# Patient Record
Sex: MALE | Race: WHITE | Employment: UNEMPLOYED | URBAN - METROPOLITAN AREA
[De-identification: names, ages, dates, MRNs, and addresses within clinical notes are randomized per-mention and may not be internally consistent; named-entity substitution may affect disease eponyms.]

---

## 2024-04-04 ENCOUNTER — HOSPITAL ENCOUNTER (OUTPATIENT)
Age: 24
Discharge: HOME | End: 2024-04-04
Payer: COMMERCIAL

## 2024-04-04 LAB
ALBUMIN SERPL-MCNC: 4.3 G/DL (ref 3.4–5)
ALBUMIN/GLOB SERPL: 1.5 {RATIO} (ref 1.1–2.2)
ALP SERPL-CCNC: 67 U/L (ref 40–129)
ALT SERPL-CCNC: 72 U/L (ref 10–40)
ANION GAP SERPL CALCULATED.3IONS-SCNC: 10 MMOL/L (ref 3–16)
AST SERPL-CCNC: 116 U/L (ref 15–37)
BILIRUB SERPL-MCNC: <0.2 MG/DL (ref 0–1)
BUN SERPL-MCNC: 9 MG/DL (ref 7–20)
CALCIUM SERPL-MCNC: 10 MG/DL (ref 8.3–10.6)
CHLORIDE SERPL-SCNC: 105 MMOL/L (ref 99–110)
CO2 SERPL-SCNC: 25 MMOL/L (ref 21–32)
CREAT SERPL-MCNC: 0.8 MG/DL (ref 0.9–1.3)
GFR SERPLBLD CREATININE-BSD FMLA CKD-EPI: >90 ML/MIN/{1.73_M2}
GLUCOSE SERPL-MCNC: 79 MG/DL (ref 70–99)
POTASSIUM SERPL-SCNC: 4.4 MMOL/L (ref 3.5–5.1)
PROT SERPL-MCNC: 7.1 G/DL (ref 6.4–8.2)
SODIUM SERPL-SCNC: 140 MMOL/L (ref 136–145)

## 2024-04-04 PROCEDURE — 80053 COMPREHEN METABOLIC PANEL: CPT

## 2024-04-04 PROCEDURE — 36415 COLL VENOUS BLD VENIPUNCTURE: CPT

## 2024-04-18 ENCOUNTER — HOSPITAL ENCOUNTER (OUTPATIENT)
Age: 24
Discharge: HOME | End: 2024-04-18
Payer: COMMERCIAL

## 2024-04-18 LAB
ALBUMIN SERPL-MCNC: 4.4 G/DL (ref 3.4–5)
ALBUMIN/GLOB SERPL: 1.5 {RATIO} (ref 1.1–2.2)
ALP SERPL-CCNC: 85 U/L (ref 40–129)
ALT SERPL-CCNC: 11 U/L (ref 10–40)
ANION GAP SERPL CALCULATED.3IONS-SCNC: 9 MMOL/L (ref 3–16)
AST SERPL-CCNC: 12 U/L (ref 15–37)
BILIRUB SERPL-MCNC: 0.3 MG/DL (ref 0–1)
BUN SERPL-MCNC: 8 MG/DL (ref 7–20)
CALCIUM SERPL-MCNC: 9.5 MG/DL (ref 8.3–10.6)
CHLORIDE SERPL-SCNC: 105 MMOL/L (ref 99–110)
CO2 SERPL-SCNC: 27 MMOL/L (ref 21–32)
CREAT SERPL-MCNC: 0.8 MG/DL (ref 0.9–1.3)
GFR SERPLBLD CREATININE-BSD FMLA CKD-EPI: >90 ML/MIN/{1.73_M2}
GLUCOSE SERPL-MCNC: 102 MG/DL (ref 70–99)
POTASSIUM SERPL-SCNC: 4.1 MMOL/L (ref 3.5–5.1)
PROT SERPL-MCNC: 7.3 G/DL (ref 6.4–8.2)
SODIUM SERPL-SCNC: 141 MMOL/L (ref 136–145)

## 2024-04-18 PROCEDURE — 80053 COMPREHEN METABOLIC PANEL: CPT

## 2024-05-03 ENCOUNTER — HOSPITAL ENCOUNTER (EMERGENCY)
Age: 24
Discharge: HOME | End: 2024-05-03
Attending: STUDENT IN AN ORGANIZED HEALTH CARE EDUCATION/TRAINING PROGRAM
Payer: COMMERCIAL

## 2024-05-03 VITALS
TEMPERATURE: 98.8 F | DIASTOLIC BLOOD PRESSURE: 74 MMHG | HEART RATE: 98 BPM | OXYGEN SATURATION: 94 % | WEIGHT: 168.5 LBS | RESPIRATION RATE: 18 BRPM | HEIGHT: 74 IN | BODY MASS INDEX: 21.62 KG/M2 | SYSTOLIC BLOOD PRESSURE: 124 MMHG

## 2024-05-03 DIAGNOSIS — B34.9 VIRAL SYNDROME: Primary | ICD-10-CM

## 2024-05-03 DIAGNOSIS — R11.0 NAUSEA: ICD-10-CM

## 2024-05-03 LAB
FLUAV RNA UPPER RESP QL NAA+PROBE: NEGATIVE
FLUBV AG NPH QL: NEGATIVE
S PYO AG THROAT QL: NEGATIVE
SARS-COV-2 RDRP RESP QL NAA+PROBE: NOT DETECTED

## 2024-05-03 PROCEDURE — 99283 EMERGENCY DEPT VISIT LOW MDM: CPT

## 2024-05-03 PROCEDURE — 6370000000 HC RX 637 (ALT 250 FOR IP): Performed by: STUDENT IN AN ORGANIZED HEALTH CARE EDUCATION/TRAINING PROGRAM

## 2024-05-03 PROCEDURE — 87804 INFLUENZA ASSAY W/OPTIC: CPT

## 2024-05-03 PROCEDURE — 87081 CULTURE SCREEN ONLY: CPT

## 2024-05-03 PROCEDURE — 87635 SARS-COV-2 COVID-19 AMP PRB: CPT

## 2024-05-03 PROCEDURE — 87880 STREP A ASSAY W/OPTIC: CPT

## 2024-05-03 RX ORDER — ONDANSETRON 4 MG/1
4 TABLET, ORALLY DISINTEGRATING ORAL 3 TIMES DAILY PRN
Qty: 21 TABLET | Refills: 0 | Status: SHIPPED | OUTPATIENT
Start: 2024-05-03 | End: 2024-05-06

## 2024-05-03 RX ORDER — MIRTAZAPINE 30 MG/1
30 TABLET, FILM COATED ORAL NIGHTLY
COMMUNITY

## 2024-05-03 RX ORDER — ONDANSETRON 4 MG/1
4 TABLET, ORALLY DISINTEGRATING ORAL ONCE
Status: COMPLETED | OUTPATIENT
Start: 2024-05-03 | End: 2024-05-03

## 2024-05-03 RX ORDER — FLUOXETINE HYDROCHLORIDE 20 MG/1
20 CAPSULE ORAL DAILY
COMMUNITY

## 2024-05-03 RX ORDER — IBUPROFEN 800 MG/1
800 TABLET ORAL EVERY 8 HOURS PRN
Qty: 30 TABLET | Refills: 0 | Status: SHIPPED | OUTPATIENT
Start: 2024-05-03 | End: 2024-05-06

## 2024-05-03 RX ORDER — IBUPROFEN 400 MG/1
800 TABLET ORAL ONCE
Status: COMPLETED | OUTPATIENT
Start: 2024-05-03 | End: 2024-05-03

## 2024-05-03 RX ADMIN — ONDANSETRON 4 MG: 4 TABLET, ORALLY DISINTEGRATING ORAL at 08:32

## 2024-05-03 RX ADMIN — IBUPROFEN 800 MG: 400 TABLET, FILM COATED ORAL at 08:52

## 2024-05-03 ASSESSMENT — LIFESTYLE VARIABLES
HOW MANY STANDARD DRINKS CONTAINING ALCOHOL DO YOU HAVE ON A TYPICAL DAY: 1 OR 2
HOW OFTEN DO YOU HAVE A DRINK CONTAINING ALCOHOL: MONTHLY OR LESS

## 2024-05-03 ASSESSMENT — PAIN SCALES - GENERAL: PAINLEVEL_OUTOF10: 7

## 2024-05-03 ASSESSMENT — PAIN DESCRIPTION - FREQUENCY: FREQUENCY: INTERMITTENT

## 2024-05-03 ASSESSMENT — PAIN - FUNCTIONAL ASSESSMENT: PAIN_FUNCTIONAL_ASSESSMENT: 0-10

## 2024-05-03 NOTE — DISCHARGE INSTRUCTIONS
You were evaluated in the emergency department for congestion and nausea. Assessments and testing completed during your visit were reassuring and at this time there is no indication for further testing, treatment or admission to the hospital. Given this it is appropriate to discharge you from the emergency department. At the time of discharge we discussed the following:    Overall I believe you likely have a virus and I expect your condition to improve over the next few days.  As we discussed I would recommend ibuprofen as needed for body aches and fever and I have prescribed a nausea medication to support your condition as it is important for you to stay hydrated and if you are having nausea/vomiting please use this medication so that you may continue to stay hydrated.    Please note that sometimes it is difficult to diagnose a medical condition early in the disease process before the disease is fully manifest. Because of this, should you develop any new or worsening symptoms, you may return at any time to the emergency department for another evaluation. If available you are also recommended to review this visit with your primary care physician or other medical provider in the next 7 days. Thank you for allowing us to care for you today.

## 2024-05-03 NOTE — ED PROVIDER NOTES
[facility] EMERGENCY DEPARTMENT     EMERGENCY DEPARTMENT ENCOUNTER         Pt Name: Carlos Villalobos   MRN: [id number]   Birthdate 2000   Date of evaluation: 5/3/2024   Provider: Homar Monsivais MD   PCP: No primary care provider on file.   Note Started: 8:18 AM EDT 5/3/24       Chief Complaint     Influenza (C/o N/V, NP cough, sore throat and body aches since late last night)      History of Present Illness     Carlos Villalobos is a 24 y.o. male who presents roughly 12 hours of illness.  He has no major contributing past medical history denies travel or sick contacts.  He states that as of last night developed congestion sore throat nonproductive cough and suffered several episodes of nausea and vomiting.  No abdominal pain no clear fevers though perhaps chills.  Given his persistent illness this morning he presents for evaluation.      I have reviewed the nursing notes and agree unless otherwise noted.    Review of Systems     Positives and pertinent negatives as per HPI.    Past Medical, Surgical, Family, and Social History     He has a past medical history of Depression.  He has no past surgical history on file.  His family history is not on file.  He reports that he has been smoking cigarettes. He has never used smokeless tobacco. He reports that he does not currently use alcohol. He reports that he does not currently use drugs.    SCREENINGS:          [place] Coma Scale  Eye Opening: Spontaneous  Best Verbal Response: Oriented  Best Motor Response: Obeys commands  [place] Coma Scale Score: 15                        CIWA Assessment  BP: (!) 131/94  Pulse: (!) 110               Medications     Previous Medications    FLUOXETINE (PROZAC) 20 MG CAPSULE    Take 1 capsule by mouth daily    MIRTAZAPINE (REMERON) 30 MG TABLET    Take 1 tablet by mouth nightly       Allergies     He has No Known Allergies.    Physical Exam     INITIAL VITALS: BP: (!) 131/94, Temp: 98.3 °F (36.8 °C), Pulse: (!) 110,  Respirations: 20, SpO2: 94 %     General: alert and conversant in no distress  Skin: warm, dry and pink without noted rashes or lesions  Head: normocephalic atraumatic  Eyes: pupils equal, extra ocular movements intact  Mouth / Pharynx: moist mucus membranes; erythema bilateral tonsillar pillars and tonsils no exudates, no significant cervical lymphadenopathy  Neck: normal range of motion without pain  Chest: clear to auscultation  Heart: regular heart tones; no murmur  noted  Abdomen: soft and non tender   Extremities: warm and well perfused, no significant edema    Additional Exams:  na    DiagnosticResults     ECG    I have reviewed the ECG as follows:    na      RADIOLOGY:    My independent review of medical imaging is as follows:    na    Final interpretation of all medical imaging per radiology as below:    No orders to display       LABS:   Results for orders placed or performed during the hospital encounter of 05/03/24   COVID-19, Rapid    Specimen: Nasopharyngeal Swab   Result Value Ref Range    SARS-CoV-2, NAAT Not Detected Not Detected   Rapid influenza A/B antigens    Specimen: Nasopharyngeal   Result Value Ref Range    Rapid Influenza A Ag Negative Negative    Rapid Influenza B Ag Negative Negative   Strep Screen Group A Throat    Specimen: Throat   Result Value Ref Range    Rapid Strep A Screen Negative Negative       ED BEDSIDE ULTRASOUND:  na    RECENT VITALS:  BP: (!) 131/94, Temp: 98.3 °F (36.8 °C), Pulse: (!) 110,Respirations: 20, SpO2: 94 %     Procedures     na    ED Course and MDM     Carlos Villalobos is a 24 y.o. male who presents with less than 1 day of illness likely represents a viral upper respiratory infection though complicated by nausea and vomiting.  Here he is afebrile but mildly tachycardic 110 otherwise hemodynamically stable.  His physical examination is overall reassuring.  He does have some erythema of the posterior pharynx without exudates consideration is given to a strep

## 2024-05-05 LAB — S PYO THROAT QL CULT: NORMAL

## 2024-05-06 ENCOUNTER — HOSPITAL ENCOUNTER (EMERGENCY)
Age: 24
Discharge: HOME | End: 2024-05-07
Attending: STUDENT IN AN ORGANIZED HEALTH CARE EDUCATION/TRAINING PROGRAM
Payer: COMMERCIAL

## 2024-05-06 ENCOUNTER — APPOINTMENT (OUTPATIENT)
Dept: GENERAL RADIOLOGY | Age: 24
End: 2024-05-06
Payer: COMMERCIAL

## 2024-05-06 VITALS
BODY MASS INDEX: 21.23 KG/M2 | OXYGEN SATURATION: 99 % | TEMPERATURE: 98.3 F | WEIGHT: 165.4 LBS | SYSTOLIC BLOOD PRESSURE: 121 MMHG | HEIGHT: 74 IN | HEART RATE: 88 BPM | RESPIRATION RATE: 18 BRPM | DIASTOLIC BLOOD PRESSURE: 83 MMHG

## 2024-05-06 DIAGNOSIS — J01.00 ACUTE MAXILLARY SINUSITIS, RECURRENCE NOT SPECIFIED: Primary | ICD-10-CM

## 2024-05-06 PROCEDURE — 71045 X-RAY EXAM CHEST 1 VIEW: CPT

## 2024-05-06 PROCEDURE — 99283 EMERGENCY DEPT VISIT LOW MDM: CPT

## 2024-05-06 RX ORDER — AMOXICILLIN AND CLAVULANATE POTASSIUM 875; 125 MG/1; MG/1
1 TABLET, FILM COATED ORAL 2 TIMES DAILY
Qty: 20 TABLET | Refills: 0 | Status: SHIPPED | OUTPATIENT
Start: 2024-05-06 | End: 2024-05-16

## 2024-05-06 RX ORDER — AMOXICILLIN AND CLAVULANATE POTASSIUM 875; 125 MG/1; MG/1
1 TABLET, FILM COATED ORAL ONCE
Status: COMPLETED | OUTPATIENT
Start: 2024-05-07 | End: 2024-05-07

## 2024-05-06 ASSESSMENT — PAIN - FUNCTIONAL ASSESSMENT: PAIN_FUNCTIONAL_ASSESSMENT: 0-10

## 2024-05-06 ASSESSMENT — LIFESTYLE VARIABLES
HOW MANY STANDARD DRINKS CONTAINING ALCOHOL DO YOU HAVE ON A TYPICAL DAY: PATIENT DOES NOT DRINK
HOW OFTEN DO YOU HAVE A DRINK CONTAINING ALCOHOL: NEVER

## 2024-05-06 ASSESSMENT — PAIN SCALES - GENERAL: PAINLEVEL_OUTOF10: 8

## 2024-05-07 PROCEDURE — 6370000000 HC RX 637 (ALT 250 FOR IP): Performed by: STUDENT IN AN ORGANIZED HEALTH CARE EDUCATION/TRAINING PROGRAM

## 2024-05-07 RX ADMIN — AMOXICILLIN AND CLAVULANATE POTASSIUM 1 TABLET: 875; 125 TABLET, FILM COATED ORAL at 00:06

## 2024-05-07 NOTE — ED PROVIDER NOTES
Emergency Department Encounter    Patient: Carlos Villalobos  MRN: [id number]  : 2000  Date of Evaluation: 2024  ED Provider:  Aj Sampson MD    Triage Chief Complaint:   Cough (Pt ambulates into ED with complaints of cough, congestion, runny nose, and body aches.  States has been going on for 5 days.  States was seen here on Wednesday or Thursday of last week and states he is not getting any better.)    [place]:  Carlos Villalobos is a 24 y.o. male presenting with complaints of nasal congestion, cough, body aches.  Triage note states that this has been going on for about 5 days but in discussion with patient states has been going on for about 7 to 10 days at this point.  States he was seen on 5/3/2024 with negative COVID, flu, strep.  States he continues to have symptoms and states his nasal congestion/discharge is more milky white to yellow than it was previously.  Denies fevers.  Denies sore throat, difficulty swallowing or handling secretions.  Denies otalgia.  Denies headache, blurred vision, focal deficits, motor or sensory changes.  Denies chest pain or shortness of breath.  States he did have 1 episode of nausea vomiting just prior to presentation which was nonbilious nonbloody and believes it was likely secondary to nasal drainage.  Denies diarrhea or constipation or blood or melena stools.  Denies abdominal pain.  Denies neck or back pain or neck pain or stiffness.    ROS - see HPI, below listed is current ROS at time of my eval:  At least 14 systems reviewed, negative other than HPI    Past Medical History:   Diagnosis Date    Depression      History reviewed. No pertinent surgical history.  History reviewed. No pertinent family history.  Social History     Socioeconomic History    Marital status: Single     Spouse name: Not on file    Number of children: Not on file    Years of education: Not on file    Highest education level: Not on file   Occupational History    Not on file   Tobacco

## 2024-05-29 ENCOUNTER — HOSPITAL ENCOUNTER (EMERGENCY)
Age: 24
Discharge: HOME | End: 2024-05-29
Attending: EMERGENCY MEDICINE
Payer: COMMERCIAL

## 2024-05-29 ENCOUNTER — APPOINTMENT (OUTPATIENT)
Dept: GENERAL RADIOLOGY | Age: 24
End: 2024-05-29
Payer: COMMERCIAL

## 2024-05-29 VITALS
SYSTOLIC BLOOD PRESSURE: 132 MMHG | BODY MASS INDEX: 22.32 KG/M2 | HEIGHT: 74 IN | WEIGHT: 173.9 LBS | OXYGEN SATURATION: 97 % | DIASTOLIC BLOOD PRESSURE: 94 MMHG | HEART RATE: 64 BPM | RESPIRATION RATE: 16 BRPM | TEMPERATURE: 98 F

## 2024-05-29 DIAGNOSIS — M89.8X1 PERISCAPULAR PAIN: Primary | ICD-10-CM

## 2024-05-29 PROCEDURE — 71046 X-RAY EXAM CHEST 2 VIEWS: CPT

## 2024-05-29 PROCEDURE — 6370000000 HC RX 637 (ALT 250 FOR IP): Performed by: EMERGENCY MEDICINE

## 2024-05-29 PROCEDURE — 99283 EMERGENCY DEPT VISIT LOW MDM: CPT

## 2024-05-29 RX ORDER — NAPROXEN 500 MG/1
500 TABLET ORAL 2 TIMES DAILY WITH MEALS
Qty: 60 TABLET | Refills: 0 | Status: SHIPPED | OUTPATIENT
Start: 2024-05-29

## 2024-05-29 RX ORDER — IBUPROFEN 600 MG/1
600 TABLET ORAL ONCE
Status: COMPLETED | OUTPATIENT
Start: 2024-05-29 | End: 2024-05-29

## 2024-05-29 RX ADMIN — IBUPROFEN 600 MG: 600 TABLET, FILM COATED ORAL at 10:16

## 2024-05-29 ASSESSMENT — PAIN SCALES - GENERAL: PAINLEVEL_OUTOF10: 7

## 2024-05-29 ASSESSMENT — LIFESTYLE VARIABLES
HOW OFTEN DO YOU HAVE A DRINK CONTAINING ALCOHOL: NEVER
HOW MANY STANDARD DRINKS CONTAINING ALCOHOL DO YOU HAVE ON A TYPICAL DAY: PATIENT DOES NOT DRINK

## 2024-05-29 ASSESSMENT — PAIN DESCRIPTION - ORIENTATION: ORIENTATION: LEFT

## 2024-05-29 ASSESSMENT — PAIN - FUNCTIONAL ASSESSMENT: PAIN_FUNCTIONAL_ASSESSMENT: 0-10

## 2024-05-29 ASSESSMENT — PAIN DESCRIPTION - LOCATION: LOCATION: SHOULDER

## 2024-05-29 NOTE — ED PROVIDER NOTES
CC:   Chief Complaint   Patient presents with    Shoulder Pain     Pt states left shoulder pain after working out 2 months ago. Pt states that he thinks that he re-injured his shoulder after working out yesterday.        HPI:  Carlos is a 24 y.o. male who presents to the emergency department with complaint of left shoulder pain.  When further questioned pain is isolated to the left periscapular area.  It began rather acutely when he was bench pressing weights.  Several months ago he had something similar happened when doing the incline chest press but his symptoms resolved with rest.  No numbness tingling or weakness.  No focal pain in the shoulder no neck pain no pain radiating down his left upper extremity.  No shortness of breath.  History obtained via the patient    External records reviewed    ROS:  All Pertinent ROS Negative Unless otherwise stated within HPI.    VITALS:  Vitals:    05/29/24 1002   BP: (!) 132/94   Pulse: 64   Resp: 16   Temp: 98 °F (36.7 °C)   SpO2: 97%        PHYSICAL EXAM:      Vital signs reviewed  General:  Patient appeared in no distress  Vitals:  Vital signs reviewed, see nurses notes  Neck:  No JVD, or lymphadenopathy.  Cardiovascular:  Regular rhythm, Normal sounds and absence of murmurs, rubs or gallops.  Shoulder exam no focal tenderness to palpation of the shoulder joint.  No tenderness over the cervical spine.  Good range of motion at the shoulder however movement of the shoulder causes pain in the left periscapular area.  Focal tenderness over the trapezius and left periscapular musculature.  No ecchymoses no crepitus no palpable fullness.  Lungs:  Clear to auscultation without rales, ronchi, or wheezing.    LABS/IMAGING:  Reviewed  XR CHEST (2 VW)   Final Result   No acute process.              Labs Reviewed - No data to display     MEDICATIONS ADMINISTERED:  Medications   ibuprofen (ADVIL;MOTRIN) tablet 600 mg (600 mg Oral Given 5/29/24 1016)       Labs reviewed    Imaging

## 2024-05-29 NOTE — DISCHARGE INSTRUCTIONS
Avoid lifting more than 10 pounds for 2 weeks.  Ice to reduce pain and swelling.  Return for worsening pain numbness tingling weakness.

## 2024-06-20 ENCOUNTER — HOSPITAL ENCOUNTER (EMERGENCY)
Age: 24
Discharge: HOME | End: 2024-06-20
Attending: STUDENT IN AN ORGANIZED HEALTH CARE EDUCATION/TRAINING PROGRAM
Payer: COMMERCIAL

## 2024-06-20 VITALS
OXYGEN SATURATION: 97 % | WEIGHT: 175 LBS | RESPIRATION RATE: 16 BRPM | TEMPERATURE: 98.3 F | DIASTOLIC BLOOD PRESSURE: 76 MMHG | BODY MASS INDEX: 23.19 KG/M2 | SYSTOLIC BLOOD PRESSURE: 137 MMHG | HEIGHT: 73 IN | HEART RATE: 76 BPM

## 2024-06-20 DIAGNOSIS — M27.3 ALVEOLAR OSTEITIS: Primary | ICD-10-CM

## 2024-06-20 PROCEDURE — 99283 EMERGENCY DEPT VISIT LOW MDM: CPT

## 2024-06-20 PROCEDURE — 64400 NJX AA&/STRD TRIGEMINAL NRV: CPT

## 2024-06-20 RX ORDER — CLINDAMYCIN HYDROCHLORIDE 300 MG/1
300 CAPSULE ORAL 3 TIMES DAILY
Qty: 30 CAPSULE | Refills: 0 | Status: SHIPPED | OUTPATIENT
Start: 2024-06-20 | End: 2024-06-30

## 2024-06-20 RX ORDER — CHLORHEXIDINE GLUCONATE ORAL RINSE 1.2 MG/ML
15 SOLUTION DENTAL 2 TIMES DAILY
Qty: 420 ML | Refills: 0 | Status: SHIPPED | OUTPATIENT
Start: 2024-06-20 | End: 2024-07-04

## 2024-06-20 RX ORDER — BUPIVACAINE HYDROCHLORIDE 5 MG/ML
10 INJECTION, SOLUTION EPIDURAL; INTRACAUDAL ONCE
Status: DISCONTINUED | OUTPATIENT
Start: 2024-06-20 | End: 2024-06-21 | Stop reason: HOSPADM

## 2024-06-20 RX ORDER — IBUPROFEN 600 MG/1
600 TABLET ORAL 4 TIMES DAILY PRN
Qty: 40 TABLET | Refills: 0 | Status: SHIPPED | OUTPATIENT
Start: 2024-06-20

## 2024-06-20 ASSESSMENT — PAIN DESCRIPTION - FREQUENCY: FREQUENCY: CONTINUOUS

## 2024-06-20 ASSESSMENT — PAIN DESCRIPTION - PAIN TYPE: TYPE: ACUTE PAIN

## 2024-06-20 ASSESSMENT — PAIN DESCRIPTION - ONSET: ONSET: ON-GOING

## 2024-06-20 ASSESSMENT — PAIN - FUNCTIONAL ASSESSMENT
PAIN_FUNCTIONAL_ASSESSMENT: 0-10
PAIN_FUNCTIONAL_ASSESSMENT: ACTIVITIES ARE NOT PREVENTED

## 2024-06-20 ASSESSMENT — PAIN DESCRIPTION - DESCRIPTORS: DESCRIPTORS: ACHING

## 2024-06-20 ASSESSMENT — PAIN DESCRIPTION - LOCATION: LOCATION: TEETH

## 2024-06-20 ASSESSMENT — PAIN DESCRIPTION - ORIENTATION: ORIENTATION: RIGHT

## 2024-06-20 ASSESSMENT — PAIN SCALES - GENERAL: PAINLEVEL_OUTOF10: 4

## 2024-06-23 NOTE — ED PROVIDER NOTES
[facility] EMERGENCY DEPARTMENT    CHIEF COMPLAINT  Dental Pain       HISTORY OF PRESENT ILLNESS  Carlos Villalobos is a 24 y.o. male who presents to the ED with dental pain for the past 1-2 days, worsening.  Had wisdom tooth removal 1 week ago.  No fevers.  Symptoms not otherwise alleviated or exacerbated by other factors.      I have reviewed the following from the nursing documentation:    Past Medical History:   Diagnosis Date    Depression      History reviewed. No pertinent surgical history.  History reviewed. No pertinent family history.  Social History     Socioeconomic History    Marital status: Single     Spouse name: Not on file    Number of children: Not on file    Years of education: Not on file    Highest education level: Not on file   Occupational History    Not on file   Tobacco Use    Smoking status: Every Day     Current packs/day: 1.00     Types: Cigarettes    Smokeless tobacco: Never   Vaping Use    Vaping Use: Never used   Substance and Sexual Activity    Alcohol use: Not Currently    Drug use: Not Currently    Sexual activity: Not on file   Other Topics Concern    Not on file   Social History Narrative    Not on file     Social Determinants of Health     Financial Resource Strain: Not on file   Food Insecurity: No Food Insecurity (5/6/2024)    Hunger Vital Sign     Worried About Running Out of Food in the Last Year: Never true     Ran Out of Food in the Last Year: Never true   Transportation Needs: Not on file   Physical Activity: Not on file   Stress: Not on file   Social Connections: Not on file   Intimate Partner Violence: Not on file   Housing Stability: Not on file     No current facility-administered medications for this encounter.     Current Outpatient Medications   Medication Sig Dispense Refill    clindamycin (CLEOCIN) 300 MG capsule Take 1 capsule by mouth 3 times daily for 10 days 30 capsule 0    ibuprofen (ADVIL;MOTRIN) 600 MG tablet Take 1 tablet by mouth 4 times daily as needed for